# Patient Record
Sex: FEMALE
[De-identification: names, ages, dates, MRNs, and addresses within clinical notes are randomized per-mention and may not be internally consistent; named-entity substitution may affect disease eponyms.]

---

## 2017-12-19 ENCOUNTER — HOSPITAL ENCOUNTER (OUTPATIENT)
Dept: HOSPITAL 53 - M LAB | Age: 42
End: 2017-12-19
Attending: NURSE PRACTITIONER

## 2017-12-19 DIAGNOSIS — Z02.1: Primary | ICD-10-CM

## 2018-01-04 ENCOUNTER — HOSPITAL ENCOUNTER (OUTPATIENT)
Dept: HOSPITAL 53 - M LAB | Age: 43
End: 2018-01-04
Attending: NURSE PRACTITIONER

## 2018-01-04 DIAGNOSIS — Z02.1: Primary | ICD-10-CM

## 2018-01-05 LAB
RUBELLA IGG QUALITATIVE: (no result)
RUBEOLA IGG ANTIBODY: 30.2 AU/ML

## 2019-06-10 NOTE — REPMRS
Patient History

The patient states she has not had a clinical breast exam in over

a year.

Family history of unknown cancer in maternal grandfather.

Taking hormonal contraceptives for 2 years.

Patient states she had gastric bypass in 2015.

 

Digital Mammo Diagnostic Bilateral: June 6, 2019 - Exam #: 

[id number]

Bilateral CC and MLO view(s) were taken.

 

Technologist: Selina Welch, Technologist

Prior study comparison: May 2, 2017, bilateral digital woman 

screen mammo, performed at .

 

FINDINGS: There are scattered fibroglandular densities.  There 

has been no change in the appearance of the mammogram from the 

prior studies.  There is a mild amount of scattered 

fibroglandular density which is fairly symmetric. There is no 

interval development of dominant mass, architectural distortion, 

or clustered microcalcification suggestive of malignancy.

 

Assessment: BI-RADS/ACR category 1 mammogram. Negative Mammogram.

 

Recommendation

Routine screening mammogram of both breasts in 1 year (for women 

over age 40).

 

This patient's Lifetime Breast Cancer RIsk is estimated at 9.5 %.

This mammogram was interpreted with the aid of an FDA-approved 

computer-aided dectection system.

 

Electronically Signed By: Jonel Hurd MD 06/10/19 9994